# Patient Record
Sex: MALE | Race: WHITE | NOT HISPANIC OR LATINO | Employment: OTHER | URBAN - METROPOLITAN AREA
[De-identification: names, ages, dates, MRNs, and addresses within clinical notes are randomized per-mention and may not be internally consistent; named-entity substitution may affect disease eponyms.]

---

## 2017-06-07 ENCOUNTER — TRANSFERRED RECORDS (OUTPATIENT)
Dept: HEALTH INFORMATION MANAGEMENT | Facility: CLINIC | Age: 67
End: 2017-06-07

## 2017-09-08 ENCOUNTER — RECORDS - HEALTHEAST (OUTPATIENT)
Dept: LAB | Facility: CLINIC | Age: 67
End: 2017-09-08

## 2017-09-08 LAB
CHOLEST SERPL-MCNC: 155 MG/DL
FASTING STATUS PATIENT QL REPORTED: YES
HDLC SERPL-MCNC: 60 MG/DL
LDLC SERPL CALC-MCNC: 83 MG/DL
TRIGL SERPL-MCNC: 61 MG/DL

## 2017-12-01 ENCOUNTER — TRANSFERRED RECORDS (OUTPATIENT)
Dept: HEALTH INFORMATION MANAGEMENT | Facility: CLINIC | Age: 67
End: 2017-12-01

## 2018-05-14 ENCOUNTER — RECORDS - HEALTHEAST (OUTPATIENT)
Dept: LAB | Facility: CLINIC | Age: 68
End: 2018-05-14

## 2018-05-14 LAB
ALBUMIN SERPL-MCNC: 3.7 G/DL (ref 3.5–5)
ALP SERPL-CCNC: 87 U/L (ref 45–120)
ALT SERPL W P-5'-P-CCNC: 30 U/L (ref 0–45)
AMYLASE SERPL-CCNC: 61 U/L (ref 5–120)
ANION GAP SERPL CALCULATED.3IONS-SCNC: 10 MMOL/L (ref 5–18)
AST SERPL W P-5'-P-CCNC: 37 U/L (ref 0–40)
BILIRUB SERPL-MCNC: 0.6 MG/DL (ref 0–1)
BUN SERPL-MCNC: 25 MG/DL (ref 8–22)
CALCIUM SERPL-MCNC: 9.6 MG/DL (ref 8.5–10.5)
CHLORIDE BLD-SCNC: 103 MMOL/L (ref 98–107)
CO2 SERPL-SCNC: 27 MMOL/L (ref 22–31)
CREAT SERPL-MCNC: 0.89 MG/DL (ref 0.7–1.3)
GFR SERPL CREATININE-BSD FRML MDRD: >60 ML/MIN/1.73M2
GLUCOSE BLD-MCNC: 100 MG/DL (ref 70–125)
LIPASE SERPL-CCNC: 27 U/L (ref 0–52)
POTASSIUM BLD-SCNC: 4.1 MMOL/L (ref 3.5–5)
PROT SERPL-MCNC: 6.7 G/DL (ref 6–8)
SODIUM SERPL-SCNC: 140 MMOL/L (ref 136–145)

## 2018-11-08 ENCOUNTER — TRANSFERRED RECORDS (OUTPATIENT)
Dept: HEALTH INFORMATION MANAGEMENT | Facility: CLINIC | Age: 68
End: 2018-11-08

## 2018-12-31 ENCOUNTER — RECORDS - HEALTHEAST (OUTPATIENT)
Dept: LAB | Facility: CLINIC | Age: 68
End: 2018-12-31

## 2018-12-31 LAB
CHOLEST SERPL-MCNC: 190 MG/DL
FASTING STATUS PATIENT QL REPORTED: NO
HDLC SERPL-MCNC: 60 MG/DL
LDLC SERPL CALC-MCNC: 65 MG/DL
TRIGL SERPL-MCNC: 325 MG/DL
TSH SERPL DL<=0.005 MIU/L-ACNC: 3.35 UIU/ML (ref 0.3–5)

## 2019-01-09 ENCOUNTER — RECORDS - HEALTHEAST (OUTPATIENT)
Dept: LAB | Facility: CLINIC | Age: 69
End: 2019-01-09

## 2019-01-09 LAB — PSA SERPL-MCNC: 0.4 NG/ML (ref 0–4.5)

## 2019-05-15 ENCOUNTER — RECORDS - HEALTHEAST (OUTPATIENT)
Dept: LAB | Facility: CLINIC | Age: 69
End: 2019-05-15

## 2019-05-15 LAB
ANION GAP SERPL CALCULATED.3IONS-SCNC: 14 MMOL/L (ref 5–18)
BUN SERPL-MCNC: 25 MG/DL (ref 8–22)
CALCIUM SERPL-MCNC: 10.5 MG/DL (ref 8.5–10.5)
CHLORIDE BLD-SCNC: 102 MMOL/L (ref 98–107)
CO2 SERPL-SCNC: 25 MMOL/L (ref 22–31)
CREAT SERPL-MCNC: 0.89 MG/DL (ref 0.7–1.3)
GFR SERPL CREATININE-BSD FRML MDRD: >60 ML/MIN/1.73M2
GLUCOSE BLD-MCNC: 119 MG/DL (ref 70–125)
POTASSIUM BLD-SCNC: 4.3 MMOL/L (ref 3.5–5)
SODIUM SERPL-SCNC: 141 MMOL/L (ref 136–145)

## 2019-11-14 ENCOUNTER — RECORDS - HEALTHEAST (OUTPATIENT)
Dept: LAB | Facility: CLINIC | Age: 69
End: 2019-11-14

## 2019-11-14 LAB — URATE SERPL-MCNC: 4.6 MG/DL (ref 3–8)

## 2020-01-10 ENCOUNTER — RECORDS - HEALTHEAST (OUTPATIENT)
Dept: LAB | Facility: CLINIC | Age: 70
End: 2020-01-10

## 2020-01-10 LAB
ALBUMIN SERPL-MCNC: 3.8 G/DL (ref 3.5–5)
ALP SERPL-CCNC: 85 U/L (ref 45–120)
ALT SERPL W P-5'-P-CCNC: 42 U/L (ref 0–45)
ANION GAP SERPL CALCULATED.3IONS-SCNC: 12 MMOL/L (ref 5–18)
AST SERPL W P-5'-P-CCNC: 44 U/L (ref 0–40)
BILIRUB SERPL-MCNC: 1 MG/DL (ref 0–1)
BUN SERPL-MCNC: 18 MG/DL (ref 8–22)
CALCIUM SERPL-MCNC: 10.3 MG/DL (ref 8.5–10.5)
CHLORIDE BLD-SCNC: 101 MMOL/L (ref 98–107)
CHOLEST SERPL-MCNC: 194 MG/DL
CO2 SERPL-SCNC: 28 MMOL/L (ref 22–31)
CREAT SERPL-MCNC: 1.01 MG/DL (ref 0.7–1.3)
FASTING STATUS PATIENT QL REPORTED: YES
GFR SERPL CREATININE-BSD FRML MDRD: >60 ML/MIN/1.73M2
GLUCOSE BLD-MCNC: 116 MG/DL (ref 70–125)
HDLC SERPL-MCNC: 71 MG/DL
LDLC SERPL CALC-MCNC: 110 MG/DL
POTASSIUM BLD-SCNC: 4.5 MMOL/L (ref 3.5–5)
PROT SERPL-MCNC: 6.9 G/DL (ref 6–8)
PSA SERPL-MCNC: 0.4 NG/ML (ref 0–4.5)
SODIUM SERPL-SCNC: 141 MMOL/L (ref 136–145)
T4 FREE SERPL-MCNC: 1.1 NG/DL (ref 0.7–1.8)
TRIGL SERPL-MCNC: 64 MG/DL
TSH SERPL DL<=0.005 MIU/L-ACNC: 5.9 UIU/ML (ref 0.3–5)
URATE SERPL-MCNC: 5.7 MG/DL (ref 3–8)

## 2020-02-11 ENCOUNTER — RECORDS - HEALTHEAST (OUTPATIENT)
Dept: LAB | Facility: CLINIC | Age: 70
End: 2020-02-11

## 2020-02-11 LAB
ALBUMIN SERPL-MCNC: 3.8 G/DL (ref 3.5–5)
ALP SERPL-CCNC: 84 U/L (ref 45–120)
ALT SERPL W P-5'-P-CCNC: 43 U/L (ref 0–45)
AST SERPL W P-5'-P-CCNC: 35 U/L (ref 0–40)
BILIRUB DIRECT SERPL-MCNC: 0.3 MG/DL
BILIRUB SERPL-MCNC: 0.7 MG/DL (ref 0–1)
PROT SERPL-MCNC: 6.6 G/DL (ref 6–8)
TSH SERPL DL<=0.005 MIU/L-ACNC: 3.14 UIU/ML (ref 0.3–5)

## 2021-04-13 ENCOUNTER — RECORDS - HEALTHEAST (OUTPATIENT)
Dept: LAB | Facility: CLINIC | Age: 71
End: 2021-04-13

## 2021-04-13 LAB
ALBUMIN SERPL-MCNC: 3.8 G/DL (ref 3.5–5)
ALP SERPL-CCNC: 94 U/L (ref 45–120)
ALT SERPL W P-5'-P-CCNC: 43 U/L (ref 0–45)
ANION GAP SERPL CALCULATED.3IONS-SCNC: 13 MMOL/L (ref 5–18)
AST SERPL W P-5'-P-CCNC: 43 U/L (ref 0–40)
BILIRUB SERPL-MCNC: 0.6 MG/DL (ref 0–1)
BUN SERPL-MCNC: 18 MG/DL (ref 8–28)
CALCIUM SERPL-MCNC: 9.7 MG/DL (ref 8.5–10.5)
CHLORIDE BLD-SCNC: 102 MMOL/L (ref 98–107)
CHOLEST SERPL-MCNC: 169 MG/DL
CO2 SERPL-SCNC: 26 MMOL/L (ref 22–31)
CREAT SERPL-MCNC: 0.82 MG/DL (ref 0.7–1.3)
FASTING STATUS PATIENT QL REPORTED: YES
GFR SERPL CREATININE-BSD FRML MDRD: >60 ML/MIN/1.73M2
GLUCOSE BLD-MCNC: 113 MG/DL (ref 70–125)
HDLC SERPL-MCNC: 52 MG/DL
LDLC SERPL CALC-MCNC: 101 MG/DL
POTASSIUM BLD-SCNC: 3.8 MMOL/L (ref 3.5–5)
PROT SERPL-MCNC: 6.7 G/DL (ref 6–8)
PSA SERPL-MCNC: 0.8 NG/ML (ref 0–6.5)
SODIUM SERPL-SCNC: 141 MMOL/L (ref 136–145)
TRIGL SERPL-MCNC: 79 MG/DL
TSH SERPL DL<=0.005 MIU/L-ACNC: 4.6 UIU/ML (ref 0.3–5)

## 2021-11-15 ENCOUNTER — LAB REQUISITION (OUTPATIENT)
Dept: LAB | Facility: CLINIC | Age: 71
End: 2021-11-15
Payer: COMMERCIAL

## 2021-11-15 DIAGNOSIS — R79.89 OTHER SPECIFIED ABNORMAL FINDINGS OF BLOOD CHEMISTRY: ICD-10-CM

## 2021-11-15 DIAGNOSIS — Z87.39 PERSONAL HISTORY OF OTHER DISEASES OF THE MUSCULOSKELETAL SYSTEM AND CONNECTIVE TISSUE: ICD-10-CM

## 2021-11-15 LAB
ALBUMIN SERPL-MCNC: 3.5 G/DL (ref 3.5–5)
ALP SERPL-CCNC: 72 U/L (ref 45–120)
ALT SERPL W P-5'-P-CCNC: 26 U/L (ref 0–45)
ANION GAP SERPL CALCULATED.3IONS-SCNC: 9 MMOL/L (ref 5–18)
AST SERPL W P-5'-P-CCNC: 34 U/L (ref 0–40)
BILIRUB SERPL-MCNC: 1.1 MG/DL (ref 0–1)
BUN SERPL-MCNC: 22 MG/DL (ref 8–28)
CALCIUM SERPL-MCNC: 10 MG/DL (ref 8.5–10.5)
CHLORIDE BLD-SCNC: 105 MMOL/L (ref 98–107)
CO2 SERPL-SCNC: 28 MMOL/L (ref 22–31)
CREAT SERPL-MCNC: 0.85 MG/DL (ref 0.7–1.3)
GFR SERPL CREATININE-BSD FRML MDRD: 88 ML/MIN/1.73M2
GLUCOSE BLD-MCNC: 115 MG/DL (ref 70–125)
POTASSIUM BLD-SCNC: 4.6 MMOL/L (ref 3.5–5)
PROT SERPL-MCNC: 6.3 G/DL (ref 6–8)
SODIUM SERPL-SCNC: 142 MMOL/L (ref 136–145)
URATE SERPL-MCNC: 6 MG/DL (ref 3–8)

## 2021-11-15 PROCEDURE — 80053 COMPREHEN METABOLIC PANEL: CPT | Mod: ORL | Performed by: FAMILY MEDICINE

## 2021-11-15 PROCEDURE — 84550 ASSAY OF BLOOD/URIC ACID: CPT | Mod: ORL | Performed by: FAMILY MEDICINE

## 2022-05-03 ENCOUNTER — LAB REQUISITION (OUTPATIENT)
Dept: LAB | Facility: CLINIC | Age: 72
End: 2022-05-03
Payer: COMMERCIAL

## 2022-05-03 DIAGNOSIS — E03.9 HYPOTHYROIDISM, UNSPECIFIED: ICD-10-CM

## 2022-05-03 DIAGNOSIS — E78.5 HYPERLIPIDEMIA, UNSPECIFIED: ICD-10-CM

## 2022-05-03 DIAGNOSIS — Z12.5 ENCOUNTER FOR SCREENING FOR MALIGNANT NEOPLASM OF PROSTATE: ICD-10-CM

## 2022-05-03 LAB
CHOLEST SERPL-MCNC: 183 MG/DL
HDLC SERPL-MCNC: 69 MG/DL
LDLC SERPL CALC-MCNC: 103 MG/DL
PSA SERPL-MCNC: 1.62 UG/L (ref 0–6.5)
TRIGL SERPL-MCNC: 56 MG/DL
TSH SERPL DL<=0.005 MIU/L-ACNC: 4.69 UIU/ML (ref 0.3–5)

## 2022-05-03 PROCEDURE — 84443 ASSAY THYROID STIM HORMONE: CPT | Mod: ORL | Performed by: FAMILY MEDICINE

## 2022-05-03 PROCEDURE — 80061 LIPID PANEL: CPT | Mod: ORL | Performed by: FAMILY MEDICINE

## 2022-05-03 PROCEDURE — G0103 PSA SCREENING: HCPCS | Mod: ORL | Performed by: FAMILY MEDICINE

## 2022-11-02 ENCOUNTER — LAB REQUISITION (OUTPATIENT)
Dept: LAB | Facility: CLINIC | Age: 72
End: 2022-11-02
Payer: COMMERCIAL

## 2022-11-02 DIAGNOSIS — Z01.818 ENCOUNTER FOR OTHER PREPROCEDURAL EXAMINATION: ICD-10-CM

## 2022-11-02 LAB
ANION GAP SERPL CALCULATED.3IONS-SCNC: 14 MMOL/L (ref 7–15)
BUN SERPL-MCNC: 20 MG/DL (ref 8–23)
CALCIUM SERPL-MCNC: 9.7 MG/DL (ref 8.8–10.2)
CHLORIDE SERPL-SCNC: 103 MMOL/L (ref 98–107)
CREAT SERPL-MCNC: 0.77 MG/DL (ref 0.67–1.17)
DEPRECATED HCO3 PLAS-SCNC: 24 MMOL/L (ref 22–29)
GFR SERPL CREATININE-BSD FRML MDRD: >90 ML/MIN/1.73M2
GLUCOSE SERPL-MCNC: 108 MG/DL (ref 70–99)
POTASSIUM SERPL-SCNC: 4.4 MMOL/L (ref 3.4–5.3)
SODIUM SERPL-SCNC: 141 MMOL/L (ref 136–145)

## 2022-11-02 PROCEDURE — 80048 BASIC METABOLIC PNL TOTAL CA: CPT | Mod: ORL | Performed by: FAMILY MEDICINE

## 2022-12-13 ENCOUNTER — LAB REQUISITION (OUTPATIENT)
Dept: LAB | Facility: CLINIC | Age: 72
End: 2022-12-13
Payer: COMMERCIAL

## 2022-12-13 DIAGNOSIS — Z01.818 ENCOUNTER FOR OTHER PREPROCEDURAL EXAMINATION: ICD-10-CM

## 2022-12-13 LAB
ANION GAP SERPL CALCULATED.3IONS-SCNC: 13 MMOL/L (ref 7–15)
BUN SERPL-MCNC: 21.5 MG/DL (ref 8–23)
CALCIUM SERPL-MCNC: 9.3 MG/DL (ref 8.8–10.2)
CHLORIDE SERPL-SCNC: 102 MMOL/L (ref 98–107)
CREAT SERPL-MCNC: 0.84 MG/DL (ref 0.67–1.17)
DEPRECATED HCO3 PLAS-SCNC: 25 MMOL/L (ref 22–29)
GFR SERPL CREATININE-BSD FRML MDRD: >90 ML/MIN/1.73M2
GLUCOSE SERPL-MCNC: 105 MG/DL (ref 70–99)
POTASSIUM SERPL-SCNC: 4.3 MMOL/L (ref 3.4–5.3)
SODIUM SERPL-SCNC: 140 MMOL/L (ref 136–145)

## 2022-12-13 PROCEDURE — 80048 BASIC METABOLIC PNL TOTAL CA: CPT | Mod: ORL | Performed by: FAMILY MEDICINE

## 2023-05-10 ENCOUNTER — LAB REQUISITION (OUTPATIENT)
Dept: LAB | Facility: CLINIC | Age: 73
End: 2023-05-10
Payer: COMMERCIAL

## 2023-05-10 DIAGNOSIS — K76.0 FATTY (CHANGE OF) LIVER, NOT ELSEWHERE CLASSIFIED: ICD-10-CM

## 2023-05-10 DIAGNOSIS — E78.5 HYPERLIPIDEMIA, UNSPECIFIED: ICD-10-CM

## 2023-05-10 DIAGNOSIS — Z12.5 ENCOUNTER FOR SCREENING FOR MALIGNANT NEOPLASM OF PROSTATE: ICD-10-CM

## 2023-05-10 DIAGNOSIS — E03.9 HYPOTHYROIDISM, UNSPECIFIED: ICD-10-CM

## 2023-05-10 LAB
ALBUMIN SERPL BCG-MCNC: 4.5 G/DL (ref 3.5–5.2)
ALP SERPL-CCNC: 82 U/L (ref 40–129)
ALT SERPL W P-5'-P-CCNC: 34 U/L (ref 10–50)
ANION GAP SERPL CALCULATED.3IONS-SCNC: 13 MMOL/L (ref 7–15)
AST SERPL W P-5'-P-CCNC: 41 U/L (ref 10–50)
BILIRUB SERPL-MCNC: 0.9 MG/DL
BUN SERPL-MCNC: 20.7 MG/DL (ref 8–23)
CALCIUM SERPL-MCNC: 9.9 MG/DL (ref 8.8–10.2)
CHLORIDE SERPL-SCNC: 102 MMOL/L (ref 98–107)
CHOLEST SERPL-MCNC: 174 MG/DL
CREAT SERPL-MCNC: 0.91 MG/DL (ref 0.67–1.17)
DEPRECATED HCO3 PLAS-SCNC: 27 MMOL/L (ref 22–29)
GFR SERPL CREATININE-BSD FRML MDRD: 90 ML/MIN/1.73M2
GLUCOSE SERPL-MCNC: 116 MG/DL (ref 70–99)
HDLC SERPL-MCNC: 69 MG/DL
LDLC SERPL CALC-MCNC: 92 MG/DL
NONHDLC SERPL-MCNC: 105 MG/DL
POTASSIUM SERPL-SCNC: 4.6 MMOL/L (ref 3.4–5.3)
PROT SERPL-MCNC: 7 G/DL (ref 6.4–8.3)
PSA SERPL DL<=0.01 NG/ML-MCNC: 0.34 NG/ML (ref 0–6.5)
SODIUM SERPL-SCNC: 142 MMOL/L (ref 136–145)
T4 FREE SERPL-MCNC: 1.76 NG/DL (ref 0.9–1.7)
TRIGL SERPL-MCNC: 63 MG/DL
TSH SERPL DL<=0.005 MIU/L-ACNC: 4.22 UIU/ML (ref 0.3–4.2)

## 2023-05-10 PROCEDURE — G0103 PSA SCREENING: HCPCS | Mod: ORL | Performed by: FAMILY MEDICINE

## 2023-05-10 PROCEDURE — 80061 LIPID PANEL: CPT | Mod: ORL | Performed by: FAMILY MEDICINE

## 2023-05-10 PROCEDURE — 80053 COMPREHEN METABOLIC PANEL: CPT | Mod: ORL | Performed by: FAMILY MEDICINE

## 2023-05-10 PROCEDURE — 84443 ASSAY THYROID STIM HORMONE: CPT | Mod: ORL | Performed by: FAMILY MEDICINE

## 2023-05-10 PROCEDURE — 84439 ASSAY OF FREE THYROXINE: CPT | Mod: ORL | Performed by: FAMILY MEDICINE

## 2024-07-02 ENCOUNTER — LAB REQUISITION (OUTPATIENT)
Dept: LAB | Facility: CLINIC | Age: 74
End: 2024-07-02
Payer: COMMERCIAL

## 2024-07-02 DIAGNOSIS — Z12.5 ENCOUNTER FOR SCREENING FOR MALIGNANT NEOPLASM OF PROSTATE: ICD-10-CM

## 2024-07-02 DIAGNOSIS — K76.0 FATTY (CHANGE OF) LIVER, NOT ELSEWHERE CLASSIFIED: ICD-10-CM

## 2024-07-02 DIAGNOSIS — E78.5 HYPERLIPIDEMIA, UNSPECIFIED: ICD-10-CM

## 2024-07-02 DIAGNOSIS — E03.9 HYPOTHYROIDISM, UNSPECIFIED: ICD-10-CM

## 2024-07-02 LAB
ALBUMIN SERPL BCG-MCNC: 4.2 G/DL (ref 3.5–5.2)
ALP SERPL-CCNC: 78 U/L (ref 40–150)
ALT SERPL W P-5'-P-CCNC: 35 U/L (ref 0–70)
ANION GAP SERPL CALCULATED.3IONS-SCNC: 11 MMOL/L (ref 7–15)
AST SERPL W P-5'-P-CCNC: 40 U/L (ref 0–45)
BILIRUB SERPL-MCNC: 0.8 MG/DL
BUN SERPL-MCNC: 16.4 MG/DL (ref 8–23)
CALCIUM SERPL-MCNC: 9.5 MG/DL (ref 8.8–10.2)
CHLORIDE SERPL-SCNC: 102 MMOL/L (ref 98–107)
CHOLEST SERPL-MCNC: 166 MG/DL
CREAT SERPL-MCNC: 0.82 MG/DL (ref 0.67–1.17)
DEPRECATED HCO3 PLAS-SCNC: 25 MMOL/L (ref 22–29)
EGFRCR SERPLBLD CKD-EPI 2021: >90 ML/MIN/1.73M2
FASTING STATUS PATIENT QL REPORTED: ABNORMAL
FASTING STATUS PATIENT QL REPORTED: NORMAL
GLUCOSE SERPL-MCNC: 117 MG/DL (ref 70–99)
HDLC SERPL-MCNC: 66 MG/DL
LDLC SERPL CALC-MCNC: 88 MG/DL
NONHDLC SERPL-MCNC: 100 MG/DL
POTASSIUM SERPL-SCNC: 3.8 MMOL/L (ref 3.4–5.3)
PROT SERPL-MCNC: 7.1 G/DL (ref 6.4–8.3)
PSA SERPL DL<=0.01 NG/ML-MCNC: 0.46 NG/ML (ref 0–6.5)
SODIUM SERPL-SCNC: 138 MMOL/L (ref 135–145)
TRIGL SERPL-MCNC: 60 MG/DL
TSH SERPL DL<=0.005 MIU/L-ACNC: 3.09 UIU/ML (ref 0.3–4.2)

## 2024-07-02 PROCEDURE — 80053 COMPREHEN METABOLIC PANEL: CPT | Mod: ORL | Performed by: FAMILY MEDICINE

## 2024-07-02 PROCEDURE — 84443 ASSAY THYROID STIM HORMONE: CPT | Mod: ORL | Performed by: FAMILY MEDICINE

## 2024-07-02 PROCEDURE — 80061 LIPID PANEL: CPT | Mod: ORL | Performed by: FAMILY MEDICINE

## 2024-07-02 PROCEDURE — G0103 PSA SCREENING: HCPCS | Mod: ORL | Performed by: FAMILY MEDICINE

## 2024-11-01 ENCOUNTER — LAB REQUISITION (OUTPATIENT)
Dept: LAB | Facility: CLINIC | Age: 74
End: 2024-11-01

## 2024-11-01 DIAGNOSIS — R10.13 EPIGASTRIC PAIN: ICD-10-CM

## 2024-11-01 LAB
BASOPHILS # BLD AUTO: 0 10E3/UL (ref 0–0.2)
BASOPHILS NFR BLD AUTO: 0 %
EOSINOPHIL # BLD AUTO: 0.1 10E3/UL (ref 0–0.7)
EOSINOPHIL NFR BLD AUTO: 1 %
ERYTHROCYTE [DISTWIDTH] IN BLOOD BY AUTOMATED COUNT: 11.9 % (ref 10–15)
HCT VFR BLD AUTO: 45.9 % (ref 40–53)
HGB BLD-MCNC: 15.8 G/DL (ref 13.3–17.7)
IMM GRANULOCYTES # BLD: 0 10E3/UL
IMM GRANULOCYTES NFR BLD: 1 %
LYMPHOCYTES # BLD AUTO: 1.1 10E3/UL (ref 0.8–5.3)
LYMPHOCYTES NFR BLD AUTO: 16 %
MCH RBC QN AUTO: 34.1 PG (ref 26.5–33)
MCHC RBC AUTO-ENTMCNC: 34.4 G/DL (ref 31.5–36.5)
MCV RBC AUTO: 99 FL (ref 78–100)
MONOCYTES # BLD AUTO: 0.7 10E3/UL (ref 0–1.3)
MONOCYTES NFR BLD AUTO: 9 %
NEUTROPHILS # BLD AUTO: 5.1 10E3/UL (ref 1.6–8.3)
NEUTROPHILS NFR BLD AUTO: 73 %
NRBC # BLD AUTO: 0 10E3/UL
NRBC BLD AUTO-RTO: 0 /100
PLATELET # BLD AUTO: 144 10E3/UL (ref 150–450)
RBC # BLD AUTO: 4.64 10E6/UL (ref 4.4–5.9)
WBC # BLD AUTO: 7 10E3/UL (ref 4–11)

## 2024-11-01 PROCEDURE — 82947 ASSAY GLUCOSE BLOOD QUANT: CPT | Performed by: FAMILY MEDICINE

## 2024-11-01 PROCEDURE — 80048 BASIC METABOLIC PNL TOTAL CA: CPT | Performed by: FAMILY MEDICINE

## 2024-11-01 PROCEDURE — 83690 ASSAY OF LIPASE: CPT | Performed by: FAMILY MEDICINE

## 2024-11-01 PROCEDURE — 85025 COMPLETE CBC W/AUTO DIFF WBC: CPT | Performed by: FAMILY MEDICINE

## 2024-11-02 LAB
ALBUMIN SERPL BCG-MCNC: 4.2 G/DL (ref 3.5–5.2)
ALP SERPL-CCNC: 81 U/L (ref 40–150)
ALT SERPL W P-5'-P-CCNC: 31 U/L (ref 0–70)
ANION GAP SERPL CALCULATED.3IONS-SCNC: 11 MMOL/L (ref 7–15)
AST SERPL W P-5'-P-CCNC: 36 U/L (ref 0–45)
BILIRUB SERPL-MCNC: 1.1 MG/DL
BUN SERPL-MCNC: 17.3 MG/DL (ref 8–23)
CALCIUM SERPL-MCNC: 9.7 MG/DL (ref 8.8–10.4)
CHLORIDE SERPL-SCNC: 101 MMOL/L (ref 98–107)
CREAT SERPL-MCNC: 0.78 MG/DL (ref 0.67–1.17)
EGFRCR SERPLBLD CKD-EPI 2021: >90 ML/MIN/1.73M2
GLUCOSE SERPL-MCNC: 121 MG/DL (ref 70–99)
HCO3 SERPL-SCNC: 27 MMOL/L (ref 22–29)
LIPASE SERPL-CCNC: 295 U/L (ref 13–60)
POTASSIUM SERPL-SCNC: 4.6 MMOL/L (ref 3.4–5.3)
PROT SERPL-MCNC: 7 G/DL (ref 6.4–8.3)
SODIUM SERPL-SCNC: 139 MMOL/L (ref 135–145)

## 2024-11-04 ENCOUNTER — TRANSFERRED RECORDS (OUTPATIENT)
Dept: HEALTH INFORMATION MANAGEMENT | Facility: CLINIC | Age: 74
End: 2024-11-04
Payer: COMMERCIAL

## 2024-11-06 ENCOUNTER — TRANSFERRED RECORDS (OUTPATIENT)
Dept: HEALTH INFORMATION MANAGEMENT | Facility: CLINIC | Age: 74
End: 2024-11-06
Payer: COMMERCIAL

## 2024-11-08 ENCOUNTER — MEDICAL CORRESPONDENCE (OUTPATIENT)
Dept: HEALTH INFORMATION MANAGEMENT | Facility: CLINIC | Age: 74
End: 2024-11-08
Payer: COMMERCIAL

## 2024-11-11 ENCOUNTER — PATIENT OUTREACH (OUTPATIENT)
Dept: ONCOLOGY | Facility: CLINIC | Age: 74
End: 2024-11-11
Payer: COMMERCIAL

## 2024-11-11 ENCOUNTER — TRANSCRIBE ORDERS (OUTPATIENT)
Dept: OTHER | Age: 74
End: 2024-11-11

## 2024-11-11 DIAGNOSIS — K86.89 PANCREATIC MASS: Primary | ICD-10-CM

## 2024-11-11 NOTE — PROGRESS NOTES
New Patient Oncology Nurse Navigator Note     Referring provider:   Humble Tarango MD at [facility]     Referred to (specialty): Medical Oncology    Requested provider (if applicable):   Yash Pinto MD     Date Referral Received:   11/11/24     Evaluation for : Pancreatic mass     Clinical History (per Nurse review of records provided):    Patient had recent CT abd/pel at [facility], ordered by Dr. Humble Tarango for epigastric pain.    Per Dr. Tarango's note on 11/6/24  His 11/4, scan showed possible pancreatic adenocarcinoma, as well as severe inflammation concerning for ongoing pancreatitis. Radiology advised consult with gastroenterology for this. They also noted an indeterminate right pulmonary lobe nodule, and recommended repeating the scan in 12 months to recheck this.    11/20 @ [facility]      Clinical Assessment / Barriers to Care (Per Nurse):      Records Location (Care Everywhere, Media, etc.)  RECORDS NEEDED:   [facility] - Need recent CT of abd/pel   EUS 11/20 @ [facility]     Additional testing needed prior to consult:   Need biopsy/EGD confirmation - scheduled 11/20 @ [facility]  ?CT chest/labs?    1215  I called in attempt to reach patient to discuss referral.  I contacted his home number and reached his wife.  She stated he is in class and asked for me to call him in 30 minutes when class was out, which I will do.    Current tentative plan pending discussion with patient, is to discuss sending him on to [facility] for EGD/EUS bx.    1230  Patient called and asked that we connect later today to discuss.  I advised he call me back when he is available.    Wife -Nayely - [phone]    Andi returned my call.  I introduced my role and reviewed what this consult visit will entail/what to expect.  I reviewed the location and let him know about a tentative hold for 12/9.  Andi states he has an upcoming EUS scheduled at [facility], on 11/20.  I let him know we are working to acquire his CT from [facility].  Once this is  done, Dr. Pinto may suggest CT chest and possible labs to be done prior to consult.  I let him know someone will be reaching out to schedule or discuss potential other appointments if Dr. Pinto advises.  Andi is likely seeking [place] for a second opinion as well. Andi has no other questions at this time.    Tentative plan:  HOLD: Dr. Pinto, 12/9, 1045 @ [facility], VIDEO, AN Hunter, RN, BSN  Oncology New Patient Nurse Navigator   [facility]  [phone]

## 2024-11-12 ENCOUNTER — PRE VISIT (OUTPATIENT)
Dept: ONCOLOGY | Facility: CLINIC | Age: 74
End: 2024-11-12
Payer: COMMERCIAL

## 2024-11-12 NOTE — TELEPHONE ENCOUNTER
RECORDS STATUS - ALL OTHER DIAGNOSIS      RECORDS RECEIVED FROM: [facility]/ [facility]    Appt Date: TBD NN WQ    Pancreatic mass   Action    Action Taken 11/12/2024 10:21AM ROBBY   I called [facility] [phone]- they will push scans to [facility] PACS and fax reports.     11/15/2024 10:53AM ROBBY   I resolved the CT from [facility] in PACS    I called [facility] to follow up on the MRI scan #4- they will push the scan over soon.     [facility] image reports are in EPIC      NOTES STATUS DETAILS   OFFICE NOTE from referring provider  Humble Tarango MD    OFFICE NOTE from medical oncologist     DISCHARGE SUMMARY from hospital     DISCHARGE REPORT from the ER     OPERATIVE REPORT     MEDICATION LIST     CLINICAL TRIAL TREATMENTS TO DATE     LABS     PATHOLOGY REPORTS     ANYTHING RELATED TO DIAGNOSIS  Labs last updated on 11/1/2024    PATHOLOGY FEDEX TRACKING   TRACKING #:   GENONOMIC TESTING     TYPE:     IMAGING (NEED IMAGES & REPORT)     CT SCANS In PACS- [facility] CT abdomen Pelvis 11/4/2024   MRI Requested- [facility]:  MRI abdomen 2018    XRAYS     ULTRASOUND     PET     IMAGE DISC FEDEX TRACKING   TRACKING #:

## 2024-11-18 NOTE — PROGRESS NOTES
11/18/24  Patient called to check in to see if anything additional was needed for work up.  I let him know Pa, our GI navigator, is following for the results of the EUS, and this will determine plan.  I let him know Pa or new patient scheduling, will be reaching out, soon after pathology results are in, to let him know if anything else is needed or plan should change.  Sherrell Hunter, RN, BSN  Oncology New Patient Nurse Navigator   [facility]  [phone]

## 2024-11-25 ENCOUNTER — PATIENT OUTREACH (OUTPATIENT)
Dept: ONCOLOGY | Facility: CLINIC | Age: 74
End: 2024-11-25
Payer: COMMERCIAL

## 2024-11-25 NOTE — PROGRESS NOTES
[facility]: Cancer Care                                                                                          Received call from Millie Loyd, care coordinator at patient's primary care - [facility] office with several questions regarding the CT scans.  Provided Millie Loyd with the nurse navigator phone number as this is a consult patient not yet seen at [facility].    Message sent to nurse anthony team to call Millie Loyd if they do not hear from her.  Phone [phone].    Paradise ALFORD RN  Cancer Care Coordinator  [facility]

## 2024-11-29 ENCOUNTER — HOSPITAL ENCOUNTER (OUTPATIENT)
Dept: CT IMAGING | Facility: CLINIC | Age: 74
Discharge: HOME | End: 2024-11-29
Attending: INTERNAL MEDICINE | Admitting: INTERNAL MEDICINE
Payer: COMMERCIAL

## 2024-11-29 DIAGNOSIS — R91.8 PULMONARY NODULES: ICD-10-CM

## 2024-11-29 DIAGNOSIS — C25.9 PANCREATIC ADENOCARCINOMA (H): ICD-10-CM

## 2024-11-29 PROCEDURE — 71250 CT THORAX DX C-: CPT

## 2024-12-01 ENCOUNTER — TRANSFERRED RECORDS (OUTPATIENT)
Dept: HEALTH INFORMATION MANAGEMENT | Facility: CLINIC | Age: 74
End: 2024-12-01
Payer: COMMERCIAL

## 2024-12-07 NOTE — PROGRESS NOTES
Virtual Visit Details    Type of service:  Video Visit     Originating Location (pt. Location): Home    Distant Location (provider location):  On-site  Platform used for Video Visit: [facility] ONCOLOGY NEW PATIENT VISIT - INITIAL CONSULTATION NOTE - Dr. Yash Pinto  Date of encounter: December 6, 2024    Cancer diagnosis: [id number] pancreatic adenocarcinoma at the uncinate process    Treatment to date: None/pending    Tumor genomic profiling: None/pending    Referring physician or other provider(s):  self-referred        History of Present Illness/Cancer Diagnosis and Evaluation to date:  Mr. Adams is a 74 year old male who joins me today for initial consultation regarding a new diagnosis of pancreas cancer.    He presents for a virtual video visit from his home in [place]; he states he is alone on the call, and has a number of questions regarding new diagnosis of pancreatic uncinate process adenocarcinoma, logistics of care, and he also looks forward to further oncology consultation at the [facility] with Dr Humble Ye two days from now.  His past medical history includes diverticulitis, which he says he follow up on in fall of 2024.  He developed mild abdominal cramping and gas pain, and was found to have elevated lipase for evaluation from his primary care physician and team.  A CT scan was performed that revealed concerning mass at the pancreatic head/uncinate process, as follows:      Per [facility] notes:  11/4/24 CT Abdomen Pelvis with Contrast  Partially necrotic poorly defined mass in the pancreatic head/uncinate process,  highly concerning for pancreatic adenocarcinoma. Gastroneurology consultation  is recommended.  Prominent distal main pancreatic duct with diffuse proximal glandular distention  and parenchymal atrophy. The findings may represent the result of prior ongoing  pancreatitis with mid duct stricture from prior severe inflammation. Additional  differential diagnostic considerations  include main duct type intrapancreatic  mucinous tumor. The graft distal colonic diverticulosis without CT evidence of  acute diverticulitis.  Indeterminate right middle lobe pulmonary nodule measures 4 to 5 mm. Consider  follow-up chest CT in 12 months for further evaluation, as clinically indicated.  **There is poorly defined hypoattenuation in the pancreatic uncinate process  measuring approximately 2.1 x 1.7 cm axially (series 3, image 63) in and amongst  a more solid region of masslike pancreatic parenchyma.  Prominent main pancreatic duct.     This was followed up with gastroenterology at the [facility], culminating in an EUS for diagnostic biopsy that was confirmatory of adenocarcinoma, as follows    11/20/24-EUS at the [facility]  Impressions/Post-Op Diagnosis:        - A mass was identified in the uncinate process of the pancreas. Tissue        was obtained from this exam, and results are pending. However, the        endosonographic appearance is consistent with adenocarcinoma. This was        staged T2 N0 Mx by endosonographic criteria. The staging applies if        malignancy is confirmed. Fine needle aspiration performed.     Amendment 11/21/2024: Amendment issued to add DNA mismatch repair enzyme immunohistochemistry results.    12/2/2024 - Amendment to report [facility] NGS results. Please see attached scanned report and updated diagnosis.   Final Diagnosis A) PANCREAS, UNCINATE PROCESS, ENDOSCOPIC ULTRASOUND-GUIDED FINE-NEEDLE ASPIRATION:  1. Positive for malignancy; adenocarcinoma  2. DNA mismatch repair enzymes intact by immunohistochemistry (reported 11/21/2024)  3. 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  To complete systemic staging, we requested CT chest, with results as follows:    11/29/24--CT chest--IMPRESSION:   1.  Scattered pulmonary nodules measuring up to 5 mm, which warrant attention on follow-up.    December 9, 2024 -- oncology consultation/virtual visit, Dr. Pinto.     He has a number of questions, mainly focusing on financial coverage and how that we determine for cancer directed therapy, which is as of care with the [facility], and other general questions of pancreas cancer.      Review Of Systems:  Comprehensive (14-point) ROS reviewed. Pertinent symptoms reviewed above per HPI.      Past medical, social, surgical, and family histories reviewed, confirmed, and pertinent details discussed with patient and family; additional sources include the medical record.    SOCIAL HISTORY: Lives in [place], [place].      Tobacco Use      Smoking status: Not on file      Smokeless tobacco: Not on file     Alcohol Use: Not on file          FAMILY HISTORY OF CANCER: Not discussed in depth this visit.      Allergies:  Allergies as of 12/09/2024    (Not on File)       Current Medications:  No current outpatient medications on file.        Physical Exam:  In-person physical exam could not be performed today in context of a Virtual Visit. Observed physical assessments made today by visualizing the patient by video link:  Vitals - Patient Reported  Pain Score: No Pain (0)             General/Constitutional: Generally appears well, not acutely ill.  HEENT: no scleral icterus, not jaundiced.  Respiratory: no labored breathing.  Musculoskeletal: appears to have full range of motion and adequate physical strength.  Skin: no jaundice, discoloration or other notable lesions.  Neurological: no evidence of tremors.  Psychiatric: no evident anxiety; fully alert and oriented with fluent  speech.      The rest of a comprehensive physical examination is deferred due to nature of video visits.     Laboratory/Imaging Studies  No visits with results within 2 Week(s) from this visit.   Latest known visit with results is:   Lab Requisition on 11/01/2024   Component Date Value Ref Range Status    Sodium 11/01/2024 139  135 - 145 mmol/L Final    Potassium 11/01/2024 4.6  3.4 - 5.3 mmol/L Final    Carbon Dioxide (CO2) 11/01/2024 27  22 - 29 mmol/L Final    Anion Gap 11/01/2024 11  7 - 15 mmol/L Final    Urea Nitrogen 11/01/2024 17.3  8.0 - 23.0 mg/dL Final    Creatinine 11/01/2024 0.78  0.67 - 1.17 mg/dL Final    GFR Estimate 11/01/2024 >90  >60 mL/min/1.73m2 Final    eGFR calculated using 2021 CKD-EPI equation.    Calcium 11/01/2024 9.7  8.8 - 10.4 mg/dL Final    Reference intervals for this test were updated on 7/16/2024 to reflect our healthy population more accurately. There may be differences in the flagging of prior results with similar values performed with this method. Those prior results can be interpreted in the context of the updated reference intervals.    Chloride 11/01/2024 101  98 - 107 mmol/L Final    Glucose 11/01/2024 121 (H)  70 - 99 mg/dL Final    Alkaline Phosphatase 11/01/2024 81  40 - 150 U/L Final    AST 11/01/2024 36  0 - 45 U/L Final    ALT 11/01/2024 31  0 - 70 U/L Final    Protein Total 11/01/2024 7.0  6.4 - 8.3 g/dL Final    Albumin 11/01/2024 4.2  3.5 - 5.2 g/dL Final    Bilirubin Total 11/01/2024 1.1  <=1.2 mg/dL Final    Lipase 11/01/2024 295 (H)  13 - 60 U/L Final    WBC Count 11/01/2024 7.0  4.0 - 11.0 10e3/uL Final    RBC Count 11/01/2024 4.64  4.40 - 5.90 10e6/uL Final    Hemoglobin 11/01/2024 15.8  13.3 - 17.7 g/dL Final    Hematocrit 11/01/2024 45.9  40.0 - 53.0 % Final    MCV 11/01/2024 99  78 - 100 fL Final    MCH 11/01/2024 34.1 (H)  26.5 - 33.0 pg Final    MCHC 11/01/2024 34.4  31.5 - 36.5 g/dL Final    RDW 11/01/2024 11.9  10.0 - 15.0 % Final    Platelet Count  11/01/2024 144 (L)  150 - 450 10e3/uL Final    % Neutrophils 11/01/2024 73  % Final    % Lymphocytes 11/01/2024 16  % Final    % Monocytes 11/01/2024 9  % Final    % Eosinophils 11/01/2024 1  % Final    % Basophils 11/01/2024 0  % Final    % Immature Granulocytes 11/01/2024 1  % Final    NRBCs per 100 WBC 11/01/2024 0  <1 /100 Final    Absolute Neutrophils 11/01/2024 5.1  1.6 - 8.3 10e3/uL Final    Absolute Lymphocytes 11/01/2024 1.1  0.8 - 5.3 10e3/uL Final    Absolute Monocytes 11/01/2024 0.7  0.0 - 1.3 10e3/uL Final    Absolute Eosinophils 11/01/2024 0.1  0.0 - 0.7 10e3/uL Final    Absolute Basophils 11/01/2024 0.0  0.0 - 0.2 10e3/uL Final    Absolute Immature Granulocytes 11/01/2024 0.0  <=0.4 10e3/uL Final    Absolute NRBCs 11/01/2024 0.0  10e3/uL Final          RADIOLOGY:  Prior to and including the day of this visit, I personally reviewed the recent imaging scans. I released the pertinent recent imaging results to [employer] in advance of this visit, and reviewed the Impression summary verbatim and in lay language during today's visit.      ASSESSMENT/PLAN:  Mr. Grady Cortez is a 74-year-old gentleman from [place], [place], with a new diagnosis of what appears to be a localized, potentially resectable form of pancreatic head/uncinate process adenocarcinoma, stage, and is sonographically as a uT2N0 form of pancreas cancer, with no evidence of lung metastasis. The  level was in the 200 to 300 range as assessed at [facility] on the day of the EUS. He potentially plans to pursue care to [facility], and wanted an additional medical oncology opinion with me here today.    I carefully explained the natural biology, course, diagnosis and treatment of pancreatic cancer. I explained in great detail that most forms and stages of pancreatic carcinoma represent what is considered to be an incurable disease, and that the purpose of chemotherapy is palliative for cases of unresectable pancreatic carcinomas. In  contrast, I reviewed that only 10 to 15% are potentially resectable, and in those cases, if surgery is feasible and safe to perform, then surgery is performed with intent to cure, buttressed with addition of chemotherapy intended to reduce risk of recurrence.  In the past decade, there's increasing use of neoadjuvant intent chemotherapy strategies, and even more so in the past few years to utilize a total neoadjuvant therapy (JOYCE) approach using any one of a number of chemotherapy regimens extrapolated from those tested and assessed and the metastatic setting. He is fortunate in that his tumor is of a relatively low and the sonographic stage (uT2N0), which improved his chances of potential resection.  We next turned our discussion to the concept of systemic chemotherapy, which he had several questions about, and its positioning chronologically in comparison to surgical resection.    I explained that there are several different chemotherapy options. The FOLFIRINOX and gemcitabine+nab-paclitaxel combination chemotherapy regimens are frequently used in the front-line setting and both are consistent with consensus guidelines including those from NCCN and ASCO. The FOLFIRINOX regimen is derived from the [place] trial by Sid et al (published in [place] Journal of Medicine 2011, volume 364, page 1004-1274) in which FOLFIRINOX was compared to single-agent gemcitabine for patients with metastatic pancreatic cancer. I reviewed that FOLFIRINOX constitutes a very aggressive regimen consisting of infusional 5-FU over 48 hours as well as infusion of oxaliplatin and irinotecan on an every 2-week basis with therapy in 4-week cycles. I quoted based on that study a risk of diarrhea, nausea and vomiting, and other GI side effects, between 10 and 20%, and fatigue in 24% of patients, neutropenia in up to 50% of the patient population and also risk of febrile neutropenia at 5.7%. In addition, for the NALIRIFOX regimen, based on the  MABLE trial published in 2023, use of that regimen was reported to have a response rate in excess of 40% for patients in the metastatic setting, as compared to the ~30% response rate reported for FOLFIRINOX or for the regimen encompassing nab paclitaxel with gemcitabine. For the latter regimen, this combination chemotherapy is administered in the form of gemcitabine IV 1000 mg/m2, and nab-paclitaxel 125 mg/m2, on a days 1/8/15 of each 28-day cycle. A study from Dr. Efrain Martinez presented at the GI ASCO Symposium in 2015 (https://ascopubs.org/doi/10.1200/jco.2015.33.3_suppl.366) determined that administering this regimen on days 1 and 15, thus dropping day 8, maintained efficacy while improving the toxicity profile. The study was subsequently published in 2017 (PMID: [id number]).      I reviewed all the above, explain the role of financial counselors in assessing financial coverage of medical care in general, and in the case of cancer treatment, cost of oncology related visits and chemotherapy infusions, and others.  I encouraged him to follow through with his scheduled appointment at the [facility], and then at whatever institution he chooses to seek care, and certainly financial counselors would be able to help guide him regarding his financial related questions.  Many of his questions focused on logistics of how to navigate health care system, I reviewed that broadly, and he stated understanding and appreciation of that explanation.  By the end, he stated that he had no remaining questions, and that I had answered all of his questions coming into the visit, and we left any potential follow up with our practice open ended. He confirmed that he has the phone number for our patient navigator should he choose to follow up at any time.     Thank you for the opportunity to be of service during today's visit.    VIRTUAL VISIT - DETAILS:    I have reviewed the note as documented above. This accurately captures the  substance of my conversation with the patient.    Date of call: December 6, 2024   Start of call: 11:08 AM  End of call: 11:28 AM    Provider location: [facility] (academic office)  Patient location: Home      Mode of Video Visit: Yeimi           I spent 20 minutes in consultation, including history and discussion with the patient including review of recent lab values and radiologic imaging results.  An additional 40 minutes was spent on the day of the visit, including reviewing pertinent medical notes and documentation from other physicians and APPs who have evaluated and treated this patient, pertinent lab values, pathology and imaging results, personal review of radiologic images, discussing the case with referring providers and/or nurse coordinator team, post-visit orders, and all post-visit documentation.    Yash Pinto MD PhD       The above was transcribed using Dragon voice recognition software that is now required for use by [facility] and [facility] in place of transcription of dictated notes.  This change may unfortunately lead into an increase in errors in the EMR. While I reviewed and edited the transcription, I may miss errors.  Please let me know of any of serious errors and I will addend the note.

## 2024-12-09 ENCOUNTER — VIRTUAL VISIT (OUTPATIENT)
Dept: ONCOLOGY | Facility: CLINIC | Age: 74
End: 2024-12-09
Attending: FAMILY MEDICINE
Payer: COMMERCIAL

## 2024-12-09 VITALS — HEIGHT: 66 IN | WEIGHT: 150 LBS | BODY MASS INDEX: 24.11 KG/M2

## 2024-12-09 DIAGNOSIS — C25.9 PANCREATIC ADENOCARCINOMA (H): Primary | ICD-10-CM

## 2024-12-09 ASSESSMENT — PAIN SCALES - GENERAL: PAINLEVEL_OUTOF10: NO PAIN (0)

## 2024-12-09 NOTE — NURSING NOTE
Is the patient currently in the state of [place]? YES    Visit mode:VIDEO    If the visit is dropped, the patient can be reconnected by:VIDEO VISIT: Send to e-mail at: [email or web address]    Will anyone else be joining the visit? NO  (If patient encounters technical issues they should call [phone]    Are changes needed to the allergy or medication list? No    Are refills needed on medications prescribed by this physician? NO    Rooming Documentation:  Questionnaire(s) completed    Reason for visit: Video Visit (New apt )    Dulce THOMAS

## 2024-12-09 NOTE — LETTER
12/9/2024      Grady Adams  [address]  [place] [address]      Dear Colleague,    Thank you for referring your patient, Grady Adams, to the [facility]. Please see a copy of my visit note below.    Virtual Visit Details    Type of service:  Video Visit     Originating Location (pt. Location): Home    Distant Location (provider location):  On-site  Platform used for Video Visit: [facility] ONCOLOGY NEW PATIENT VISIT - INITIAL CONSULTATION NOTE - Dr. 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 Adams is a 74 year old male who joins me today for initial consultation regarding a new diagnosis of pancreas cancer.    He presents for a virtual video visit from his home in [place]; he states he is alone on the call, and has a number of questions regarding new diagnosis of pancreatic uncinate process adenocarcinoma, logistics of care, and he also looks forward to further oncology consultation at the [facility] with Dr Humble Ye two days from now.  His past medical history includes diverticulitis, which he says he follow up on in fall of 2024.  He developed mild abdominal cramping and gas pain, and was found to have elevated lipase for evaluation from his primary care physician and team.  A CT scan was performed that revealed concerning mass at the pancreatic head/uncinate process, as follows:      Per [facility] notes:  11/4/24 CT Abdomen Pelvis with Contrast  Partially necrotic poorly defined mass in the pancreatic head/uncinate process,  highly concerning for pancreatic adenocarcinoma. Gastroneurology consultation  is recommended.  Prominent distal main pancreatic duct with  diffuse proximal glandular distention  and parenchymal atrophy. The findings may represent the result of prior ongoing  pancreatitis with mid duct stricture from prior severe inflammation. Additional  differential diagnostic considerations include main duct type intrapancreatic  mucinous tumor. The graft distal colonic diverticulosis without CT evidence of  acute diverticulitis.  Indeterminate right middle lobe pulmonary nodule measures 4 to 5 mm. Consider  follow-up chest CT in 12 months for further evaluation, as clinically indicated.  **There is poorly defined hypoattenuation in the pancreatic uncinate process  measuring approximately 2.1 x 1.7 cm axially (series 3, image 63) in and amongst  a more solid region of masslike pancreatic parenchyma.  Prominent main pancreatic duct.     This was followed up with gastroenterology at the [facility], culminating in an EUS for diagnostic biopsy that was confirmatory of adenocarcinoma, as follows    11/20/24-EUS at the [facility]  Impressions/Post-Op Diagnosis:        - A mass was identified in the uncinate process of the pancreas. Tissue        was obtained from this exam, and results are pending. However, the        endosonographic appearance is consistent with adenocarcinoma. This was        staged T2 N0 Mx by endosonographic criteria. The staging applies if        malignancy is confirmed. Fine needle aspiration performed.     Amendment 11/21/2024: Amendment issued to add DNA mismatch repair enzyme immunohistochemistry results.    12/2/2024 - Amendment to report [facility] NGS results. Please see attached scanned report and updated diagnosis.   Final Diagnosis A) PANCREAS, UNCINATE PROCESS, ENDOSCOPIC ULTRASOUND-GUIDED FINE-NEEDLE ASPIRATION:  1. Positive for malignancy; adenocarcinoma  2. DNA mismatch repair enzymes intact by immunohistochemistry (reported 11/21/2024)  3. 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  To complete systemic staging, we requested CT chest, with results as follows:    11/29/24--CT chest--IMPRESSION:   1.  Scattered pulmonary nodules measuring up to 5 mm, which warrant attention on follow-up.    December 9, 2024 -- oncology consultation/virtual visit, Dr. Pinto.     He has a number of questions, mainly focusing on financial coverage and how that we determine for cancer directed therapy, which is as of care with the [facility], and other general questions of pancreas cancer.      Review Of Systems:  Comprehensive (14-point) ROS reviewed. Pertinent symptoms reviewed above per HPI.      Past medical, social, surgical, and family histories reviewed, confirmed, and pertinent details discussed with patient and family; additional sources include the medical record.    SOCIAL HISTORY: Lives in [place], [place].      Tobacco Use      Smoking status: Not on file      Smokeless tobacco: Not on file     Alcohol Use: Not on file          FAMILY HISTORY OF CANCER: Not discussed in depth this visit.      Allergies:  Allergies as of 12/09/2024     (Not on File)       Current Medications:  No current outpatient medications on file.        Physical Exam:  In-person physical exam could not be performed today in context of a Virtual Visit. Observed physical assessments made today by visualizing the patient by video link:  Vitals - Patient Reported  Pain Score: No Pain (0)             General/Constitutional: Generally appears well, not acutely ill.  HEENT: no scleral icterus, not jaundiced.  Respiratory: no labored breathing.  Musculoskeletal: appears  to have full range of motion and adequate physical strength.  Skin: no jaundice, discoloration or other notable lesions.  Neurological: no evidence of tremors.  Psychiatric: no evident anxiety; fully alert and oriented with fluent speech.      The rest of a comprehensive physical examination is deferred due to nature of video visits.     Laboratory/Imaging Studies  No visits with results within 2 Week(s) from this visit.   Latest known visit with results is:   Lab Requisition on 11/01/2024   Component Date Value Ref Range Status     Sodium 11/01/2024 139  135 - 145 mmol/L Final     Potassium 11/01/2024 4.6  3.4 - 5.3 mmol/L Final     Carbon Dioxide (CO2) 11/01/2024 27  22 - 29 mmol/L Final     Anion Gap 11/01/2024 11  7 - 15 mmol/L Final     Urea Nitrogen 11/01/2024 17.3  8.0 - 23.0 mg/dL Final     Creatinine 11/01/2024 0.78  0.67 - 1.17 mg/dL Final     GFR Estimate 11/01/2024 >90  >60 mL/min/1.73m2 Final    eGFR calculated using 2021 CKD-EPI equation.     Calcium 11/01/2024 9.7  8.8 - 10.4 mg/dL Final    Reference intervals for this test were updated on 7/16/2024 to reflect our healthy population more accurately. There may be differences in the flagging of prior results with similar values performed with this method. Those prior results can be interpreted in the context of the updated reference intervals.     Chloride 11/01/2024 101  98 - 107 mmol/L Final     Glucose 11/01/2024 121 (H)  70 - 99 mg/dL Final     Alkaline Phosphatase 11/01/2024 81  40 - 150 U/L Final     AST 11/01/2024 36  0 - 45 U/L Final     ALT 11/01/2024 31  0 - 70 U/L Final     Protein Total 11/01/2024 7.0  6.4 - 8.3 g/dL Final     Albumin 11/01/2024 4.2  3.5 - 5.2 g/dL Final     Bilirubin Total 11/01/2024 1.1  <=1.2 mg/dL Final     Lipase 11/01/2024 295 (H)  13 - 60 U/L Final     WBC Count 11/01/2024 7.0  4.0 - 11.0 10e3/uL Final     RBC Count 11/01/2024 4.64  4.40 - 5.90 10e6/uL Final     Hemoglobin 11/01/2024 15.8  13.3 - 17.7 g/dL Final      Hematocrit 11/01/2024 45.9  40.0 - 53.0 % Final     MCV 11/01/2024 99  78 - 100 fL Final     MCH 11/01/2024 34.1 (H)  26.5 - 33.0 pg Final     MCHC 11/01/2024 34.4  31.5 - 36.5 g/dL Final     RDW 11/01/2024 11.9  10.0 - 15.0 % Final     Platelet Count 11/01/2024 144 (L)  150 - 450 10e3/uL Final     % Neutrophils 11/01/2024 73  % Final     % Lymphocytes 11/01/2024 16  % Final     % Monocytes 11/01/2024 9  % Final     % Eosinophils 11/01/2024 1  % Final     % Basophils 11/01/2024 0  % Final     % Immature Granulocytes 11/01/2024 1  % Final     NRBCs per 100 WBC 11/01/2024 0  <1 /100 Final     Absolute Neutrophils 11/01/2024 5.1  1.6 - 8.3 10e3/uL Final     Absolute Lymphocytes 11/01/2024 1.1  0.8 - 5.3 10e3/uL Final     Absolute Monocytes 11/01/2024 0.7  0.0 - 1.3 10e3/uL Final     Absolute Eosinophils 11/01/2024 0.1  0.0 - 0.7 10e3/uL Final     Absolute Basophils 11/01/2024 0.0  0.0 - 0.2 10e3/uL Final     Absolute Immature Granulocytes 11/01/2024 0.0  <=0.4 10e3/uL Final     Absolute NRBCs 11/01/2024 0.0  10e3/uL Final          RADIOLOGY:  Prior to and including the day of this visit, I personally reviewed the recent imaging scans. I released the pertinent recent imaging results to Reble in advance of this visit, and reviewed the Impression summary verbatim and in lay language during today's visit.      ASSESSMENT/PLAN:  Mr. Grady Cortez is a 74-year-old gentleman from [place], [place], with a new diagnosis of what appears to be a localized, potentially resectable form of pancreatic head/uncinate process adenocarcinoma, stage, and is sonographically as a uT2N0 form of pancreas cancer, with no evidence of lung metastasis. The  level was in the 200 to 300 range as assessed at [facility] on the day of the EUS. He potentially plans to pursue care to [facility], and wanted an additional medical oncology opinion with me here today.    I carefully explained the natural biology, course, diagnosis and treatment of  pancreatic cancer. I explained in great detail that most forms and stages of pancreatic carcinoma represent what is considered to be an incurable disease, and that the purpose of chemotherapy is palliative for cases of unresectable pancreatic carcinomas. In contrast, I reviewed that only 10 to 15% are potentially resectable, and in those cases, if surgery is feasible and safe to perform, then surgery is performed with intent to cure, buttressed with addition of chemotherapy intended to reduce risk of recurrence.  In the past decade, there's increasing use of neoadjuvant intent chemotherapy strategies, and even more so in the past few years to utilize a total neoadjuvant therapy (JOYCE) approach using any one of a number of chemotherapy regimens extrapolated from those tested and assessed and the metastatic setting. He is fortunate in that his tumor is of a relatively low and the sonographic stage (uT2N0), which improved his chances of potential resection.  We next turned our discussion to the concept of systemic chemotherapy, which he had several questions about, and its positioning chronologically in comparison to surgical resection.    I explained that there are several different chemotherapy options. The FOLFIRINOX and gemcitabine+nab-paclitaxel combination chemotherapy regimens are frequently used in the front-line setting and both are consistent with consensus guidelines including those from NCCN and ASCO. The FOLFIRINOX regimen is derived from the [place] trial by Sid et al (published in [place] Journal of Medicine 2011, volume 364, page 0577-1125) in which FOLFIRINOX was compared to single-agent gemcitabine for patients with metastatic pancreatic cancer. I reviewed that FOLFIRINOX constitutes a very aggressive regimen consisting of infusional 5-FU over 48 hours as well as infusion of oxaliplatin and irinotecan on an every 2-week basis with therapy in 4-week cycles. I quoted based on that study a risk of  diarrhea, nausea and vomiting, and other GI side effects, between 10 and 20%, and fatigue in 24% of patients, neutropenia in up to 50% of the patient population and also risk of febrile neutropenia at 5.7%. In addition, for the NALIRIFOX regimen, based on the MABLE trial published in 2023, use of that regimen was reported to have a response rate in excess of 40% for patients in the metastatic setting, as compared to the ~30% response rate reported for FOLFIRINOX or for the regimen encompassing nab paclitaxel with gemcitabine. For the latter regimen, this combination chemotherapy is administered in the form of gemcitabine IV 1000 mg/m2, and nab-paclitaxel 125 mg/m2, on a days 1/8/15 of each 28-day cycle. A study from Dr. Efrain Martinez presented at the GI ASCO Symposium in 2015 (https://ascopubs.org/doi/10.1200/jco.2015.33.3_suppl.366) determined that administering this regimen on days 1 and 15, thus dropping day 8, maintained efficacy while improving the toxicity profile. The study was subsequently published in 2017 (PMID: [id number]).      I reviewed all the above, explain the role of financial counselors in assessing financial coverage of medical care in general, and in the case of cancer treatment, cost of oncology related visits and chemotherapy infusions, and others.  I encouraged him to follow through with his scheduled appointment at the [facility], and then at whatever institution he chooses to seek care, and certainly financial counselors would be able to help guide him regarding his financial related questions.  Many of his questions focused on logistics of how to navigate health care system, I reviewed that broadly, and he stated understanding and appreciation of that explanation.  By the end, he stated that he had no remaining questions, and that I had answered all of his questions coming into the visit, and we left any potential follow up with our practice open ended. He confirmed that he has the  phone number for our patient navigator should he choose to follow up at any time.     Thank you for the opportunity to be of service during today's visit.    VIRTUAL VISIT - DETAILS:    I have reviewed the note as documented above. This accurately captures the substance of my conversation with the patient.    Date of call: December 6, 2024   Start of call: 11:08 AM  End of call: 11:28 AM    Provider location: [facility] (academic office)  Patient location: Home      Mode of Video Visit: Amwell           I spent 20 minutes in consultation, including history and discussion with the patient including review of recent lab values and radiologic imaging results.  An additional 40 minutes was spent on the day of the visit, including reviewing pertinent medical notes and documentation from other physicians and APPs who have evaluated and treated this patient, pertinent lab values, pathology and imaging results, personal review of radiologic images, discussing the case with referring providers and/or nurse coordinator team, post-visit orders, and all post-visit documentation.    Yash Pinto MD PhD       The above was transcribed using Dragon voice recognition software that is now required for use by [facility] and [facility] in place of transcription of dictated notes.  This change may unfortunately lead into an increase in errors in the EMR. While I reviewed and edited the transcription, I may miss errors.  Please let me know of any of serious errors and I will addend the note.          Again, thank you for allowing me to participate in the care of your patient.        Sincerely,        Yash Pinto MD

## 2024-12-31 NOTE — PROGRESS NOTES
"  Interventional Radiology - Pre Procedure Chart Review  [facility]  Jan 2, 2025      Procedure Requested: port placement  Requested by: Dr. Luna      History and Physical Reviewed: H&P documented within 30 days (by Dr. Luna on 12/17/24).  I have personally reviewed the patient's medical history and have updated the medical record as necessary.    HPI: 74 year old patient with newly diagnosed pancreatic adenocarcinoma and liver biopsy confirming metastases. D/t advanced nature of disease, surgical intervention not an option for treatment. Planning for chemotherapy initiation. Presenting today for port placement to facilitate treatments.    Clinical notes reviewed    Pertinent medications reviewed:   Anticoagulation: ASA 81 per chart review - no hold required for procedure   Antibiotic: Ancef 2g ordered for procedure    No Known Allergies        EXAM:  There were no vitals taken for this visit.  Not assessed    LABS:  No results found for: \"INR\"    Lab Results   Component Value Date    WBC 7.0 11/01/2024    HGB 15.8 11/01/2024     (L) 11/01/2024       No results found for: \"CREATININE\"    No components found for: \"K\"      PLAN:  Planning for port placement in IR.     Radiologist to further review procedure with patient.    EMR reviewed. No formal assessment completed.     Total time: 15 minutes       SENAIT Tomas CNP  Interventional Radiology      "

## 2025-01-02 ENCOUNTER — HOSPITAL ENCOUNTER (OUTPATIENT)
Dept: INTERVENTIONAL RADIOLOGY/VASCULAR | Facility: CLINIC | Age: 75
Discharge: HOME | End: 2025-01-02
Attending: INTERNAL MEDICINE
Payer: COMMERCIAL

## 2025-01-02 VITALS
HEART RATE: 74 BPM | TEMPERATURE: 98.4 F | SYSTOLIC BLOOD PRESSURE: 135 MMHG | RESPIRATION RATE: 10 BRPM | DIASTOLIC BLOOD PRESSURE: 67 MMHG | OXYGEN SATURATION: 97 %

## 2025-01-02 DIAGNOSIS — C25.0 PRIMARY CANCER OF HEAD OF PANCREAS (H): ICD-10-CM

## 2025-01-02 PROCEDURE — 250N000011 HC RX IP 250 OP 636: Performed by: NURSE PRACTITIONER

## 2025-01-02 PROCEDURE — C1788 PORT, INDWELLING, IMP: HCPCS

## 2025-01-02 PROCEDURE — 250N000009 HC RX 250: Performed by: RADIOLOGY

## 2025-01-02 PROCEDURE — 250N000009 HC RX 250: Performed by: NURSE PRACTITIONER

## 2025-01-02 PROCEDURE — 250N000011 HC RX IP 250 OP 636: Performed by: RADIOLOGY

## 2025-01-02 PROCEDURE — 76937 US GUIDE VASCULAR ACCESS: CPT

## 2025-01-02 PROCEDURE — 272N000500 HC NEEDLE CR2

## 2025-01-02 PROCEDURE — C1769 GUIDE WIRE: HCPCS

## 2025-01-02 RX ORDER — FEXOFENADINE HCL 180 MG/1
180 TABLET ORAL DAILY
COMMUNITY

## 2025-01-02 RX ORDER — LIDOCAINE HYDROCHLORIDE AND EPINEPHRINE 10; 10 MG/ML; UG/ML
20 INJECTION, SOLUTION INFILTRATION; PERINEURAL ONCE
Status: COMPLETED | OUTPATIENT
Start: 2025-01-02 | End: 2025-01-02

## 2025-01-02 RX ORDER — NALOXONE HYDROCHLORIDE 0.4 MG/ML
0.4 INJECTION, SOLUTION INTRAMUSCULAR; INTRAVENOUS; SUBCUTANEOUS
Status: ACTIVE | OUTPATIENT
Start: 2025-01-02

## 2025-01-02 RX ORDER — ACETAMINOPHEN 325 MG/1
650 TABLET ORAL
Status: ACTIVE | OUTPATIENT
Start: 2025-01-02

## 2025-01-02 RX ORDER — HEPARIN SODIUM,PORCINE 10 UNIT/ML
5-10 VIAL (ML) INTRAVENOUS
Status: ACTIVE | OUTPATIENT
Start: 2025-01-02

## 2025-01-02 RX ORDER — HEPARIN SODIUM (PORCINE) LOCK FLUSH IV SOLN 100 UNIT/ML 100 UNIT/ML
5 SOLUTION INTRAVENOUS ONCE
Status: COMPLETED | OUTPATIENT
Start: 2025-01-02 | End: 2025-01-02

## 2025-01-02 RX ORDER — FLUMAZENIL 0.1 MG/ML
0.2 INJECTION, SOLUTION INTRAVENOUS
Status: ACTIVE | OUTPATIENT
Start: 2025-01-02

## 2025-01-02 RX ORDER — LIDOCAINE 40 MG/G
CREAM TOPICAL
Status: ACTIVE | OUTPATIENT
Start: 2025-01-02

## 2025-01-02 RX ORDER — LEVOTHYROXINE SODIUM 112 UG/1
112 TABLET ORAL
COMMUNITY

## 2025-01-02 RX ORDER — ASPIRIN 81 MG/1
81 TABLET ORAL DAILY
COMMUNITY

## 2025-01-02 RX ORDER — FENTANYL CITRATE 50 UG/ML
25-50 INJECTION, SOLUTION INTRAMUSCULAR; INTRAVENOUS EVERY 5 MIN PRN
Status: ACTIVE | OUTPATIENT
Start: 2025-01-02

## 2025-01-02 RX ORDER — HEPARIN SODIUM 200 [USP'U]/100ML
1 INJECTION, SOLUTION INTRAVENOUS CONTINUOUS PRN
Status: ACTIVE | OUTPATIENT
Start: 2025-01-02

## 2025-01-02 RX ORDER — HEPARIN SODIUM (PORCINE) LOCK FLUSH IV SOLN 100 UNIT/ML 100 UNIT/ML
5-10 SOLUTION INTRAVENOUS
Status: ACTIVE | OUTPATIENT
Start: 2025-01-02

## 2025-01-02 RX ORDER — NALOXONE HYDROCHLORIDE 0.4 MG/ML
0.2 INJECTION, SOLUTION INTRAMUSCULAR; INTRAVENOUS; SUBCUTANEOUS
Status: ACTIVE | OUTPATIENT
Start: 2025-01-02

## 2025-01-02 RX ORDER — LISINOPRIL 20 MG/1
20 TABLET ORAL DAILY
COMMUNITY

## 2025-01-02 RX ORDER — CEFAZOLIN SODIUM/WATER 2 G/20 ML
2 SYRINGE (ML) INTRAVENOUS
Status: COMPLETED | OUTPATIENT
Start: 2025-01-02 | End: 2025-01-02

## 2025-01-02 RX ORDER — HEPARIN SODIUM,PORCINE 10 UNIT/ML
5-10 VIAL (ML) INTRAVENOUS EVERY 24 HOURS
Status: ACTIVE | OUTPATIENT
Start: 2025-01-02

## 2025-01-02 RX ORDER — HEPARIN SODIUM 200 [USP'U]/100ML
1 INJECTION, SOLUTION INTRAVENOUS EVERY 5 MIN PRN
Status: ACTIVE | OUTPATIENT
Start: 2025-01-02

## 2025-01-02 RX ORDER — AMLODIPINE BESYLATE 5 MG/1
5 TABLET ORAL DAILY
COMMUNITY

## 2025-01-02 RX ADMIN — LIDOCAINE HYDROCHLORIDE 20 ML: 10 INJECTION, SOLUTION INFILTRATION; PERINEURAL at 14:06

## 2025-01-02 RX ADMIN — LIDOCAINE HYDROCHLORIDE,EPINEPHRINE BITARTRATE 20 ML: 10; .01 INJECTION, SOLUTION INFILTRATION; PERINEURAL at 14:07

## 2025-01-02 RX ADMIN — MIDAZOLAM HYDROCHLORIDE 1 MG: 1 INJECTION, SOLUTION INTRAMUSCULAR; INTRAVENOUS at 14:06

## 2025-01-02 RX ADMIN — CEFAZOLIN 2 G: 10 INJECTION, POWDER, FOR SOLUTION INTRAVENOUS at 13:11

## 2025-01-02 RX ADMIN — HEPARIN SODIUM (PORCINE) LOCK FLUSH IV SOLN 100 UNIT/ML 5 ML: 100 SOLUTION at 14:14

## 2025-01-02 RX ADMIN — FENTANYL CITRATE 50 MCG: 50 INJECTION INTRAMUSCULAR; INTRAVENOUS at 14:06

## 2025-01-02 NOTE — PRE-PROCEDURE
GENERAL PRE-PROCEDURE:   Procedure:  Port placement  Date/Time:  1/2/2025 1:31 PM    Verbal consent obtained?: Yes    Written consent obtained?: Yes    Risks and benefits: Risks, benefits and alternatives were discussed    Consent given by:  Patient  Patient states understanding of procedure being performed: Yes    Patient's understanding of procedure matches consent: Yes    Procedure consent matches procedure scheduled: Yes    Expected level of sedation:  Moderate  Appropriately NPO:  Yes  Mallampati  :  Grade 2- soft palate, base of uvula, tonsillar pillars, and portion of posterior pharyngeal wall visible  Lungs:  Lungs clear with good breath sounds bilaterally  Heart:  Normal heart sounds and rate  History & Physical reviewed:  History and physical reviewed and no updates needed  Statement of review:  I have reviewed the lab findings, diagnostic data, medications, and the plan for sedation

## 2025-01-02 NOTE — IR NOTE
Patient Name: Grady Adams  Medical Record Number: [id number]  Today's Date: 1/2/2025    Procedure: Chest port placement  Proceduralist: Dr. Jerome    Procedure Start: 1406  Procedure end: 1413  Sedation medications administered: 1 mg midazolam and 50 mcg fentanyl   Sedation time: 15 minutes    Other Notes: Pt arrived to IR room 1 from Pre/post bay 2. Consent reviewed. Pt denies any questions or concerns regarding procedure. Pt positioned supine and monitored per protocol. Pt tolerated procedure without any noted complications. VSS on RA. Pt transferred back to Pre/post bay 2.

## 2025-01-02 NOTE — DISCHARGE INSTRUCTIONS
Port Placement Procedure Discharge Instructions:  You had a port placed. A port is a small medical device that is placed under the skin and is connected to a vein with a catheter (thin, flexible tube). Ports can be used to administer IV medications (including chemotherapy), fluids or blood products or for blood lab draws. Please follow the below instructions after your procedure:    Care Instructions:  - If you received sedation for your procedure, do not drive or operate heavy machinery for the rest of the day.  - You may shower beginning tomorrow (post procedure day #1). Do not scrub site until well healed; pat dry gently with a towel.  - You likely have skin adhesive over your port site. Skin adhesive works like a bandage to keep the site covered and protected. Do not use antibiotic ointment or creams/lotions over adhesive as it can break it down. The skin adhesive will peel off on its own (typically in 5-14 days).  - Avoid submerging the port site under water (ex: tub baths, Jacuzzis, lakes, hot tubs and pools) for 10 days or until your site is well healed.  - You may have some discomfort, minimal swelling, redness and/or bruising at your port site/procedure site. You may take over the counter pain medication for discomfort (follow the package directions) or apply an ice pack wrapped in a towel over the site (rotating 20 minutes with ice pack on and 20 minutes with ice pack off) for comfort as needed. It can take several days for these to resolve.  - Avoid heavy lifting (greater than 10 pounds) and strenuous activities for 2 days following your procedure.   - If you experience significant bleeding at site, apply pressure with hands above the clavicle bone, sit upright and seek immediate medical assistance.  - Ports need to be flushed approximately every 4-6 weeks, if not being used more frequently. Follow up with the provider who ordered your port placement for further instructions for this.    Seek medical  evaluation or contact [facility] BARROSO RN Line at [phone] if you experience the following:  - Uncontrolled bleeding from port site  - Fever (greater than 101 F (38.3C))  - Purulent (yellow/green/foul smelling) drainage from port insertion site  - Increasing pain at port site  - Increasing redness at port site

## 2025-03-03 ENCOUNTER — LAB REQUISITION (OUTPATIENT)
Dept: LAB | Facility: CLINIC | Age: 75
End: 2025-03-03

## 2025-03-03 DIAGNOSIS — J22 UNSPECIFIED ACUTE LOWER RESPIRATORY INFECTION: ICD-10-CM

## 2025-03-03 LAB
BASOPHILS # BLD MANUAL: 0 10E3/UL (ref 0–0.2)
BASOPHILS NFR BLD MANUAL: 0 %
EOSINOPHIL # BLD MANUAL: 0 10E3/UL (ref 0–0.7)
EOSINOPHIL NFR BLD MANUAL: 0 %
ERYTHROCYTE [DISTWIDTH] IN BLOOD BY AUTOMATED COUNT: 16.5 % (ref 10–15)
HCT VFR BLD AUTO: 32.6 % (ref 40–53)
HGB BLD-MCNC: 10.4 G/DL (ref 13.3–17.7)
LYMPHOCYTES # BLD MANUAL: 0.4 10E3/UL (ref 0.8–5.3)
LYMPHOCYTES NFR BLD MANUAL: 2 %
MCH RBC QN AUTO: 33.7 PG (ref 26.5–33)
MCHC RBC AUTO-ENTMCNC: 31.9 G/DL (ref 31.5–36.5)
MCV RBC AUTO: 106 FL (ref 78–100)
MONOCYTES # BLD MANUAL: 0 10E3/UL (ref 0–1.3)
MONOCYTES NFR BLD MANUAL: 0 %
NEUTROPHILS # BLD MANUAL: 18.2 10E3/UL (ref 1.6–8.3)
NEUTROPHILS NFR BLD MANUAL: 98 %
PLAT MORPH BLD: ABNORMAL
PLATELET # BLD AUTO: 132 10E3/UL (ref 150–450)
RBC # BLD AUTO: 3.09 10E6/UL (ref 4.4–5.9)
RBC MORPH BLD: ABNORMAL
WBC # BLD AUTO: 18.6 10E3/UL (ref 4–11)

## 2025-03-03 PROCEDURE — 85014 HEMATOCRIT: CPT | Performed by: FAMILY MEDICINE

## 2025-03-03 PROCEDURE — 85007 BL SMEAR W/DIFF WBC COUNT: CPT | Performed by: FAMILY MEDICINE

## 2025-07-07 ENCOUNTER — LAB REQUISITION (OUTPATIENT)
Dept: LAB | Facility: CLINIC | Age: 75
End: 2025-07-07

## 2025-07-07 DIAGNOSIS — R35.0 FREQUENCY OF MICTURITION: ICD-10-CM

## 2025-07-07 LAB
ALBUMIN UR-MCNC: 20 MG/DL
APPEARANCE UR: CLEAR
BILIRUB UR QL STRIP: NEGATIVE
CAOX CRY #/AREA URNS HPF: ABNORMAL /HPF
COLOR UR AUTO: YELLOW
GLUCOSE UR STRIP-MCNC: 70 MG/DL
HGB UR QL STRIP: NEGATIVE
KETONES UR STRIP-MCNC: NEGATIVE MG/DL
LEUKOCYTE ESTERASE UR QL STRIP: NEGATIVE
MUCOUS THREADS #/AREA URNS LPF: PRESENT /LPF
NITRATE UR QL: NEGATIVE
PH UR STRIP: 6.5 [PH] (ref 5–7)
RBC URINE: <1 /HPF
SP GR UR STRIP: 1.02 (ref 1–1.03)
SQUAMOUS EPITHELIAL: <1 /HPF
UROBILINOGEN UR STRIP-MCNC: 4 MG/DL
WBC URINE: 2 /HPF

## 2025-07-07 PROCEDURE — 81003 URINALYSIS AUTO W/O SCOPE: CPT | Performed by: STUDENT IN AN ORGANIZED HEALTH CARE EDUCATION/TRAINING PROGRAM

## 2025-08-28 ENCOUNTER — LAB REQUISITION (OUTPATIENT)
Dept: LAB | Facility: CLINIC | Age: 75
End: 2025-08-28

## 2025-08-28 DIAGNOSIS — E03.9 HYPOTHYROIDISM, UNSPECIFIED: ICD-10-CM

## 2025-08-28 LAB
T4 FREE SERPL-MCNC: 1.25 NG/DL (ref 0.9–1.7)
TSH SERPL DL<=0.005 MIU/L-ACNC: 7.35 UIU/ML (ref 0.3–4.2)

## 2025-08-28 PROCEDURE — 84439 ASSAY OF FREE THYROXINE: CPT | Performed by: FAMILY MEDICINE

## 2025-08-28 PROCEDURE — 84443 ASSAY THYROID STIM HORMONE: CPT | Performed by: FAMILY MEDICINE

## 2025-08-30 ENCOUNTER — HOSPITAL ENCOUNTER (OUTPATIENT)
Dept: CT IMAGING | Facility: CLINIC | Age: 75
Discharge: HOME | End: 2025-08-30
Payer: COMMERCIAL

## 2025-08-30 DIAGNOSIS — R05.2 SUBACUTE COUGH: ICD-10-CM

## 2025-08-30 LAB
CREAT BLD-MCNC: 0.7 MG/DL (ref 0.7–1.2)
EGFRCR SERPLBLD CKD-EPI 2021: >60 ML/MIN/1.73M2

## 2025-08-30 PROCEDURE — 82565 ASSAY OF CREATININE: CPT

## 2025-08-30 PROCEDURE — 71275 CT ANGIOGRAPHY CHEST: CPT

## 2025-08-30 PROCEDURE — 255N000002 HC RX 255 OP 636

## 2025-08-30 RX ORDER — IOPAMIDOL 755 MG/ML
100 INJECTION, SOLUTION INTRAVASCULAR ONCE
Status: DISCONTINUED | OUTPATIENT
Start: 2025-08-30 | End: 2025-08-30

## 2025-08-30 RX ADMIN — IOHEXOL 75 ML: 350 INJECTION, SOLUTION INTRAVENOUS at 16:19

## (undated) RX ORDER — CEFAZOLIN SODIUM/WATER 2 G/20 ML
SYRINGE (ML) INTRAVENOUS
Status: DISPENSED
Start: 2025-01-02

## (undated) RX ORDER — FENTANYL CITRATE 50 UG/ML
INJECTION, SOLUTION INTRAMUSCULAR; INTRAVENOUS
Status: DISPENSED
Start: 2025-01-02